# Patient Record
Sex: FEMALE | Race: BLACK OR AFRICAN AMERICAN | URBAN - METROPOLITAN AREA
[De-identification: names, ages, dates, MRNs, and addresses within clinical notes are randomized per-mention and may not be internally consistent; named-entity substitution may affect disease eponyms.]

---
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
## 2025-03-13 NOTE — ED PROVIDER NOTE - TEMPLATE, MLM
March 13, 2025     Patient: Jeremy Cortes   YOB: 2012   Date of Visit: 3/13/2025       To Whom it May Concern:    Jeremy Cortes was seen in my clinic on 3/13/2025 at 8:00 AM.     Please excuse Jeremy for her absence from school on the date listed above to be able to make her appointment.    Sincerely,         Daisy Rodrigues LCSW    Medical information is confidential and cannot be disclosed without the written consent of the patient or her representative.      
General (Pediatric)

## 2025-08-18 ENCOUNTER — NON-APPOINTMENT (OUTPATIENT)
Age: 20
End: 2025-08-18